# Patient Record
Sex: FEMALE | Race: WHITE | NOT HISPANIC OR LATINO | URBAN - METROPOLITAN AREA
[De-identification: names, ages, dates, MRNs, and addresses within clinical notes are randomized per-mention and may not be internally consistent; named-entity substitution may affect disease eponyms.]

---

## 2020-07-15 ENCOUNTER — PROBLEM (OUTPATIENT)
Dept: URBAN - METROPOLITAN AREA CLINIC 79 | Facility: CLINIC | Age: 75
End: 2020-07-15

## 2020-07-15 VITALS — HEIGHT: 55 IN

## 2020-07-15 DIAGNOSIS — Z96.1: ICD-10-CM

## 2020-07-15 DIAGNOSIS — H11.441: ICD-10-CM

## 2020-07-15 PROCEDURE — 92134 CPTRZ OPH DX IMG PST SGM RTA: CPT | Mod: NC

## 2020-07-15 PROCEDURE — 92014 COMPRE OPH EXAM EST PT 1/>: CPT

## 2020-07-15 ASSESSMENT — VISUAL ACUITY
OS_CC: 20/25-1
OD_CC: 20/20-1

## 2020-07-15 ASSESSMENT — TONOMETRY
OS_IOP_MMHG: 14
OD_IOP_MMHG: 13

## 2021-04-26 ENCOUNTER — ESTABLISHED COMPREHENSIVE EXAM (OUTPATIENT)
Dept: URBAN - METROPOLITAN AREA CLINIC 79 | Facility: CLINIC | Age: 76
End: 2021-04-26

## 2021-04-26 VITALS — HEIGHT: 55 IN

## 2021-04-26 DIAGNOSIS — Z96.1: ICD-10-CM

## 2021-04-26 DIAGNOSIS — H52.4: ICD-10-CM

## 2021-04-26 DIAGNOSIS — H31.091: ICD-10-CM

## 2021-04-26 PROCEDURE — 92014 COMPRE OPH EXAM EST PT 1/>: CPT

## 2021-04-26 PROCEDURE — 92250 FUNDUS PHOTOGRAPHY W/I&R: CPT

## 2021-04-26 PROCEDURE — 92015 DETERMINE REFRACTIVE STATE: CPT | Mod: GY

## 2021-04-26 ASSESSMENT — VISUAL ACUITY
OS_CC: 20/30
OS_CC: 20/25
OD_SC: 20/30
OS_SC: 20/30
OD_CC: 20/30
OD_CC: 20/20-2

## 2021-04-26 ASSESSMENT — TONOMETRY
OD_IOP_MMHG: 14
OS_IOP_MMHG: 14

## 2024-07-01 ENCOUNTER — ESTABLISHED COMPREHENSIVE EXAM (OUTPATIENT)
Dept: URBAN - METROPOLITAN AREA CLINIC 79 | Facility: CLINIC | Age: 79
End: 2024-07-01

## 2024-07-01 DIAGNOSIS — H43.813: ICD-10-CM

## 2024-07-01 DIAGNOSIS — G43.B0: ICD-10-CM

## 2024-07-01 DIAGNOSIS — H31.091: ICD-10-CM

## 2024-07-01 DIAGNOSIS — H52.4: ICD-10-CM

## 2024-07-01 DIAGNOSIS — Z96.1: ICD-10-CM

## 2024-07-01 PROCEDURE — 92014 COMPRE OPH EXAM EST PT 1/>: CPT

## 2024-07-01 PROCEDURE — 92250 FUNDUS PHOTOGRAPHY W/I&R: CPT

## 2024-07-01 PROCEDURE — 92015 DETERMINE REFRACTIVE STATE: CPT

## 2024-07-01 ASSESSMENT — VISUAL ACUITY
OS_SC: 20/50-1
OS_CC: 20/30-2
OD_CC: 20/30
OD_CC: J1
OD_SC: 20/40
OS_CC: J2

## 2024-07-01 ASSESSMENT — TONOMETRY
OD_IOP_MMHG: 12
OS_IOP_MMHG: 13

## 2024-08-15 ENCOUNTER — HOSPITAL ENCOUNTER (EMERGENCY)
Dept: HOSPITAL 99 - EMR | Age: 79
LOS: 1 days | Discharge: HOME | End: 2024-08-16
Payer: MEDICARE

## 2024-08-15 ENCOUNTER — HOSPITAL ENCOUNTER (EMERGENCY)
Dept: HOSPITAL 99 - EMR | Age: 79
Discharge: HOME | End: 2024-08-15
Payer: MEDICARE

## 2024-08-15 VITALS — SYSTOLIC BLOOD PRESSURE: 147 MMHG | DIASTOLIC BLOOD PRESSURE: 106 MMHG | RESPIRATION RATE: 16 BRPM

## 2024-08-15 VITALS — DIASTOLIC BLOOD PRESSURE: 95 MMHG | SYSTOLIC BLOOD PRESSURE: 147 MMHG

## 2024-08-15 VITALS — DIASTOLIC BLOOD PRESSURE: 80 MMHG | RESPIRATION RATE: 16 BRPM | SYSTOLIC BLOOD PRESSURE: 122 MMHG

## 2024-08-15 VITALS — RESPIRATION RATE: 16 BRPM | DIASTOLIC BLOOD PRESSURE: 86 MMHG | SYSTOLIC BLOOD PRESSURE: 118 MMHG

## 2024-08-15 VITALS — DIASTOLIC BLOOD PRESSURE: 86 MMHG | SYSTOLIC BLOOD PRESSURE: 136 MMHG

## 2024-08-15 VITALS — DIASTOLIC BLOOD PRESSURE: 86 MMHG | SYSTOLIC BLOOD PRESSURE: 118 MMHG | RESPIRATION RATE: 16 BRPM

## 2024-08-15 VITALS — RESPIRATION RATE: 20 BRPM | DIASTOLIC BLOOD PRESSURE: 91 MMHG | SYSTOLIC BLOOD PRESSURE: 141 MMHG

## 2024-08-15 VITALS — BODY MASS INDEX: 33.4 KG/M2

## 2024-08-15 VITALS — RESPIRATION RATE: 22 BRPM

## 2024-08-15 DIAGNOSIS — G47.30: ICD-10-CM

## 2024-08-15 DIAGNOSIS — R04.0: Primary | ICD-10-CM

## 2024-08-15 DIAGNOSIS — Z90.710: ICD-10-CM

## 2024-08-15 DIAGNOSIS — J44.9: ICD-10-CM

## 2024-08-15 DIAGNOSIS — Z87.442: ICD-10-CM

## 2024-08-15 DIAGNOSIS — Z79.01: ICD-10-CM

## 2024-08-15 DIAGNOSIS — Z86.73: ICD-10-CM

## 2024-08-15 DIAGNOSIS — Z95.0: ICD-10-CM

## 2024-08-15 DIAGNOSIS — I10: ICD-10-CM

## 2024-08-15 DIAGNOSIS — I48.91: ICD-10-CM

## 2024-08-15 LAB
ERYTHROCYTE [DISTWIDTH] IN BLOOD BY AUTOMATED COUNT: 14.1 % (ref 11.5–14.5)
HCT VFR BLD AUTO: 37.4 % (ref 37–47)
HGB BLD-MCNC: 12.8 G/DL (ref 12–16)
MCHC RBC AUTO-ENTMCNC: 34.2 G/DL (ref 33–37)
MCV RBC AUTO: 81.7 FL (ref 81–99)
NRBC BLD AUTO-RTO: 0 %
PLATELET # BLD AUTO: 236 10^3/UL (ref 130–400)

## 2024-08-15 PROCEDURE — 99282 EMERGENCY DEPT VISIT SF MDM: CPT

## 2024-08-15 PROCEDURE — 30901 CONTROL OF NOSEBLEED: CPT

## 2024-08-15 RX ADMIN — TRANEXAMIC ACID 100: 10 INJECTION, SOLUTION INTRAVENOUS at 21:34

## 2024-08-15 RX ADMIN — ACETAMINOPHEN 1000 MG: 500 TABLET ORAL at 21:40

## 2024-08-15 NOTE — EDRN
pt knocking on triage door stating that she thinks she just went into afib.  Ekg ordered and obtained.

## 2024-08-16 VITALS — DIASTOLIC BLOOD PRESSURE: 91 MMHG | RESPIRATION RATE: 12 BRPM | SYSTOLIC BLOOD PRESSURE: 149 MMHG

## 2024-08-16 VITALS — RESPIRATION RATE: 16 BRPM

## 2024-08-16 VITALS — DIASTOLIC BLOOD PRESSURE: 88 MMHG | RESPIRATION RATE: 16 BRPM | SYSTOLIC BLOOD PRESSURE: 153 MMHG

## 2024-08-16 VITALS — RESPIRATION RATE: 15 BRPM

## 2024-08-16 VITALS — RESPIRATION RATE: 14 BRPM | DIASTOLIC BLOOD PRESSURE: 83 MMHG | SYSTOLIC BLOOD PRESSURE: 120 MMHG

## 2024-08-16 VITALS — RESPIRATION RATE: 17 BRPM

## 2024-08-16 VITALS — DIASTOLIC BLOOD PRESSURE: 88 MMHG | RESPIRATION RATE: 14 BRPM | SYSTOLIC BLOOD PRESSURE: 146 MMHG

## 2024-08-16 VITALS — RESPIRATION RATE: 12 BRPM

## 2024-08-16 VITALS — RESPIRATION RATE: 14 BRPM

## 2024-08-16 RX ADMIN — ACETAMINOPHEN 650 MG: 325 TABLET ORAL at 04:41

## 2025-03-03 ENCOUNTER — HOSPITAL ENCOUNTER (OUTPATIENT)
Dept: HOSPITAL 99 - RAD | Age: 80
End: 2025-03-03
Payer: MEDICARE

## 2025-03-03 DIAGNOSIS — R10.12: Primary | ICD-10-CM

## 2025-03-03 DIAGNOSIS — R19.7: ICD-10-CM

## 2025-03-03 LAB
ALBUMIN SERPL-MCNC: 3.8 G/DL (ref 3.5–5)
ALP SERPL-CCNC: 99 U/L (ref 38–126)
ALT SERPL-CCNC: 16 U/L (ref 0–35)
AST SERPL-CCNC: 24 U/L (ref 14–36)
BUN SERPL-MCNC: 20 MG/DL (ref 7–17)
CALCIUM SERPL-MCNC: 10.2 MG/DL (ref 8.4–10.2)
CHLORIDE SERPL-SCNC: 107 MMOL/L (ref 98–107)
CO2 SERPL-SCNC: 27 MMOL/L (ref 22–30)
EGFR: > 60
ERYTHROCYTE [DISTWIDTH] IN BLOOD BY AUTOMATED COUNT: 16 % (ref 11.5–14.5)
GLUCOSE SERPL-MCNC: 90 MG/DL (ref 70–99)
HCT VFR BLD AUTO: 36 % (ref 37–47)
HGB BLD-MCNC: 11.5 G/DL (ref 12–16)
LIPASE SERPL-CCNC: 96 U/L (ref 23–300)
MCHC RBC AUTO-ENTMCNC: 31.9 G/DL (ref 33–37)
MCV RBC AUTO: 82.6 FL (ref 81–99)
NRBC BLD AUTO-RTO: 0 %
PLATELET # BLD AUTO: 267 10^3/UL (ref 130–400)
POTASSIUM SERPL-SCNC: 4.5 MMOL/L (ref 3.5–5.1)
PROT SERPL-MCNC: 6.6 G/DL (ref 6.3–8.2)
SODIUM SERPL-SCNC: 139 MMOL/L (ref 135–145)

## 2025-03-11 ENCOUNTER — HOSPITAL ENCOUNTER (OUTPATIENT)
Dept: HOSPITAL 99 - RAD | Age: 80
End: 2025-03-11
Payer: MEDICARE

## 2025-03-11 DIAGNOSIS — R10.12: Primary | ICD-10-CM
